# Patient Record
Sex: FEMALE | Race: WHITE | NOT HISPANIC OR LATINO | Employment: OTHER | URBAN - METROPOLITAN AREA
[De-identification: names, ages, dates, MRNs, and addresses within clinical notes are randomized per-mention and may not be internally consistent; named-entity substitution may affect disease eponyms.]

---

## 2018-02-25 ENCOUNTER — OFFICE VISIT (OUTPATIENT)
Dept: URGENT CARE | Facility: PHYSICIAN GROUP | Age: 78
End: 2018-02-25
Payer: MEDICARE

## 2018-02-25 VITALS
TEMPERATURE: 98.8 F | BODY MASS INDEX: 23.96 KG/M2 | RESPIRATION RATE: 14 BRPM | DIASTOLIC BLOOD PRESSURE: 76 MMHG | HEART RATE: 88 BPM | WEIGHT: 143.8 LBS | SYSTOLIC BLOOD PRESSURE: 134 MMHG | OXYGEN SATURATION: 96 % | HEIGHT: 65 IN

## 2018-02-25 DIAGNOSIS — J22 LOWER RESPIRATORY INFECTION (E.G., BRONCHITIS, PNEUMONIA, PNEUMONITIS, PULMONITIS): ICD-10-CM

## 2018-02-25 PROCEDURE — 99214 OFFICE O/P EST MOD 30 MIN: CPT | Performed by: PHYSICIAN ASSISTANT

## 2018-02-25 RX ORDER — PROMETHAZINE HYDROCHLORIDE AND CODEINE PHOSPHATE 6.25; 1 MG/5ML; MG/5ML
5 SYRUP ORAL
Qty: 120 ML | Refills: 0 | Status: SHIPPED | OUTPATIENT
Start: 2018-02-25 | End: 2018-03-09

## 2018-02-25 RX ORDER — AZITHROMYCIN 250 MG/1
TABLET, FILM COATED ORAL
Qty: 6 TAB | Refills: 0 | Status: SHIPPED | OUTPATIENT
Start: 2018-02-25 | End: 2020-03-10

## 2018-02-25 ASSESSMENT — ENCOUNTER SYMPTOMS
COUGH: 1
GASTROINTESTINAL NEGATIVE: 1
HEADACHES: 1
SPUTUM PRODUCTION: 1
EYES NEGATIVE: 1
PSYCHIATRIC NEGATIVE: 1
CARDIOVASCULAR NEGATIVE: 1
MUSCULOSKELETAL NEGATIVE: 1

## 2018-02-25 NOTE — PROGRESS NOTES
Subjective:      Fifi Clark is a 77 y.o. female who presents with Cough (x 1 week /)            HPI  Chief Complaint   Patient presents with   • Cough     x 1 week /       HPI:  Fifi Clark is a 77 y.o. female who presents with cough and chest congestion and vomiting.  Trying robitussen but not helping.  No fever.  Runny nose.  Cough is productive with green and thick mucus.  Some blood.  Wet sounding cough.  Did get flu vaccine..  Patient denies  SOB, chest pain, palpitations, fever, chills.      Past Medical History:   Diagnosis Date   • Hyperlipemia    • Hypertension        History reviewed. No pertinent surgical history.    History reviewed. No pertinent family history.  No pertinent family history.    Social History     Social History   • Marital status:      Spouse name: N/A   • Number of children: N/A   • Years of education: N/A     Occupational History   • Not on file.     Social History Main Topics   • Smoking status: Never Smoker   • Smokeless tobacco: Never Used   • Alcohol use Yes   • Drug use: No   • Sexual activity: Not on file     Other Topics Concern   • Not on file     Social History Narrative   • No narrative on file         Current Outpatient Prescriptions:   •  lisinopril, 10 mg, Oral, DAILY, 2/25/2018  •  simvastatin, 40 mg, Oral, Nightly, 2/25/2018  •  HYDROcodone-acetaminophen, 1 Tab, Oral, Q6HRS PRN (Patient not taking: Reported on 2/25/2018), Not Taking at Unknown time  •  aspirin, 81 mg, Oral, DAILY, not taking at Unknown    No Known Allergies      Review of Systems   Constitutional: Positive for malaise/fatigue.   HENT: Positive for congestion.    Eyes: Negative.    Respiratory: Positive for cough and sputum production.    Cardiovascular: Negative.    Gastrointestinal: Negative.    Genitourinary: Negative.    Musculoskeletal: Negative.    Skin: Negative.    Neurological: Positive for headaches.   Endo/Heme/Allergies: Negative.    Psychiatric/Behavioral: Negative.            "Objective:     /76   Pulse 88   Temp 37.1 °C (98.8 °F)   Resp 14   Ht 1.638 m (5' 4.5\")   Wt 65.2 kg (143 lb 12.8 oz)   SpO2 96%   BMI 24.30 kg/m²      Physical Exam       Nursing note and vitals reviewed.    Constitutional:   Appropriately groomed, pleasant affect, well nourished, in NAD.    Head:   Normocephalic, atraumatic.    Eyes:   PERRLA, EOM's full, sclera white, conjunctiva not erythematous, and medial canthus without exudate bilaterally.    Ears:  Auricle and tragus non-tender to manipulation.  No pre-auricular lymphadenopathy or mastoid ttp.  EACs with mild cerumen bilaterally, not erythematous.  TM’s pearly gray with cone of light present and umbo and malleolus visible bilaterally.  No bulging or fluid bubbles present in middle ear.  Hearing grossly intact to voice.    Nose:  Nares not patent bilaterally.  Nasal mucosa edematous with white rhinorrhea bilaterally.  Mild sinus tenderness to percussion.    Throat:  Dentition wnl, mucosa moist without lesions.  Oropharynx mildly erythematous, with no enlargement of the palatine tonsils bilaterally with no exudates.    Post nasal drainage  present.  Soft palate rises symmetrically bilaterally and uvula midline.      Neck: Neck supple, with mild anterior lymphadenopathy that is soft and mobile to palpation. Thyroid non-palpable without tenderness or nodules. No supraclavicular lymphadenopathy.    Lungs:  Respiratory effort not labored without accessory muscle use.  Lungs with inspiratory wheezes to auscultation. Questionable bibasilar rales. Rhonchi cleared with cough.    Heart:  RRR, without murmurs rubs or gallops.  Radial and dorsalis pedis pulse 2+ bilaterally.  No LE edema.    Musculoskeletal:  Gait non-antalgic with a narrow base.    Derm:  Skin without rashes or lesions with good turgor pressure.      Psychiatric:  Mood, affect, and judgement appropriate.       Assessment/Plan:     1. Lower respiratory infection (e.g., bronchitis, " pneumonia, pneumonitis, pulmonitis)  azithromycin (ZITHROMAX) 250 MG Tab    promethazine-codeine (PHENERGAN-CODEINE) 6.25-10 MG/5ML Syrup      Patient presents with one week of worsening lower respiratory infection now with thicker yellow-green mucus production. On exam patient is afebrile with a pulse ox of 96%. Gradual bibasilar rales. Plan to treat with azithromycin and recommend pushing fluids and nasal saline irrigation. Also prescribed cough medicine for bedtime use only.    Narcotic use report obtained with no indication of narcotic overuse or misuse and deemed necessary for treatment. Sedation, dependence, and constipation precautions given. Avoid use while driving or operating heavy machinery.      Patient was in agreement with this treatment plan and seemed to understand without barriers. All questions were encouraged and answered.  Reviewed signs and symptoms of when to seek emergency medical care.     Please note that this dictation was created using voice recognition software.  I have made every reasonable attempt to correct obvious errors, but I expect there are errors of moni and possibly content that I did not discover before finalizing the note.

## 2018-12-12 ENCOUNTER — APPOINTMENT (RX ONLY)
Dept: URBAN - METROPOLITAN AREA CLINIC 35 | Facility: CLINIC | Age: 78
Setting detail: DERMATOLOGY
End: 2018-12-12

## 2018-12-12 DIAGNOSIS — L82.1 OTHER SEBORRHEIC KERATOSIS: ICD-10-CM

## 2018-12-12 DIAGNOSIS — Z85.828 PERSONAL HISTORY OF OTHER MALIGNANT NEOPLASM OF SKIN: ICD-10-CM

## 2018-12-12 DIAGNOSIS — D22 MELANOCYTIC NEVI: ICD-10-CM

## 2018-12-12 DIAGNOSIS — L81.4 OTHER MELANIN HYPERPIGMENTATION: ICD-10-CM

## 2018-12-12 DIAGNOSIS — L11.1 TRANSIENT ACANTHOLYTIC DERMATOSIS [GROVER]: ICD-10-CM

## 2018-12-12 DIAGNOSIS — Z71.89 OTHER SPECIFIED COUNSELING: ICD-10-CM

## 2018-12-12 PROBLEM — D22.72 MELANOCYTIC NEVI OF LEFT LOWER LIMB, INCLUDING HIP: Status: ACTIVE | Noted: 2018-12-12

## 2018-12-12 PROBLEM — D22.5 MELANOCYTIC NEVI OF TRUNK: Status: ACTIVE | Noted: 2018-12-12

## 2018-12-12 PROBLEM — I10 ESSENTIAL (PRIMARY) HYPERTENSION: Status: ACTIVE | Noted: 2018-12-12

## 2018-12-12 PROCEDURE — 99213 OFFICE O/P EST LOW 20 MIN: CPT

## 2018-12-12 PROCEDURE — ? OBSERVATION AND MEASURE

## 2018-12-12 PROCEDURE — ? COUNSELING

## 2018-12-12 ASSESSMENT — LOCATION SIMPLE DESCRIPTION DERM
LOCATION SIMPLE: RIGHT SHOULDER
LOCATION SIMPLE: LEFT PRETIBIAL REGION
LOCATION SIMPLE: RIGHT PRETIBIAL REGION
LOCATION SIMPLE: ABDOMEN
LOCATION SIMPLE: NOSE
LOCATION SIMPLE: RIGHT ANKLE
LOCATION SIMPLE: LEFT EYEBROW
LOCATION SIMPLE: SUPERIOR FOREHEAD
LOCATION SIMPLE: LEFT UPPER BACK
LOCATION SIMPLE: LEFT POSTERIOR UPPER ARM
LOCATION SIMPLE: RIGHT UPPER BACK
LOCATION SIMPLE: UPPER BACK
LOCATION SIMPLE: LEFT CHEEK
LOCATION SIMPLE: LEFT PLANTAR SURFACE

## 2018-12-12 ASSESSMENT — LOCATION ZONE DERM
LOCATION ZONE: ARM
LOCATION ZONE: NOSE
LOCATION ZONE: TRUNK
LOCATION ZONE: FACE
LOCATION ZONE: LEG
LOCATION ZONE: FEET

## 2018-12-12 ASSESSMENT — LOCATION DETAILED DESCRIPTION DERM
LOCATION DETAILED: RIGHT DISTAL PRETIBIAL REGION
LOCATION DETAILED: LEFT SUPERIOR PREAURICULAR CHEEK
LOCATION DETAILED: LEFT CENTRAL MALAR CHEEK
LOCATION DETAILED: SUPERIOR THORACIC SPINE
LOCATION DETAILED: LEFT SUPERIOR UPPER BACK
LOCATION DETAILED: LEFT LATERAL EYEBROW
LOCATION DETAILED: RIGHT MID-UPPER BACK
LOCATION DETAILED: LEFT MEDIAL PLANTAR HEEL
LOCATION DETAILED: LEFT DISTAL PRETIBIAL REGION
LOCATION DETAILED: RIGHT RIB CAGE
LOCATION DETAILED: LEFT RIB CAGE
LOCATION DETAILED: RIGHT MEDIAL POSTERIOR ANKLE
LOCATION DETAILED: NASAL DORSUM
LOCATION DETAILED: PERIUMBILICAL SKIN
LOCATION DETAILED: SUPERIOR MID FOREHEAD
LOCATION DETAILED: RIGHT MEDIAL UPPER BACK
LOCATION DETAILED: LEFT MID-UPPER BACK
LOCATION DETAILED: LEFT DISTAL POSTERIOR UPPER ARM
LOCATION DETAILED: RIGHT POSTERIOR SHOULDER

## 2018-12-12 NOTE — PROCEDURE: OBSERVATION
Size Of Lesion: 1cm x 1.5cm
Detail Level: Simple
Morphology Per Location (Optional): Linear brown macule

## 2018-12-12 NOTE — PROCEDURE: COUNSELING
Detail Level: Zone
Detail Level: Detailed
Detail Level: Simple
Patient Specific Counseling (Will Not Stick From Patient To Patient): Advised she can use OTC HC 1% bid 2 weeks on 2 weeks off prn

## 2019-12-18 ENCOUNTER — APPOINTMENT (RX ONLY)
Dept: URBAN - METROPOLITAN AREA CLINIC 35 | Facility: CLINIC | Age: 79
Setting detail: DERMATOLOGY
End: 2019-12-18

## 2019-12-18 DIAGNOSIS — Z71.89 OTHER SPECIFIED COUNSELING: ICD-10-CM

## 2019-12-18 DIAGNOSIS — L82.1 OTHER SEBORRHEIC KERATOSIS: ICD-10-CM

## 2019-12-18 DIAGNOSIS — L81.4 OTHER MELANIN HYPERPIGMENTATION: ICD-10-CM

## 2019-12-18 DIAGNOSIS — D22 MELANOCYTIC NEVI: ICD-10-CM

## 2019-12-18 DIAGNOSIS — D18.0 HEMANGIOMA: ICD-10-CM

## 2019-12-18 DIAGNOSIS — Z85.828 PERSONAL HISTORY OF OTHER MALIGNANT NEOPLASM OF SKIN: ICD-10-CM

## 2019-12-18 PROBLEM — D18.01 HEMANGIOMA OF SKIN AND SUBCUTANEOUS TISSUE: Status: ACTIVE | Noted: 2019-12-18

## 2019-12-18 PROBLEM — D22.5 MELANOCYTIC NEVI OF TRUNK: Status: ACTIVE | Noted: 2019-12-18

## 2019-12-18 PROBLEM — D22.72 MELANOCYTIC NEVI OF LEFT LOWER LIMB, INCLUDING HIP: Status: ACTIVE | Noted: 2019-12-18

## 2019-12-18 PROCEDURE — ? OBSERVATION AND MEASURE

## 2019-12-18 PROCEDURE — ? COUNSELING

## 2019-12-18 PROCEDURE — 99213 OFFICE O/P EST LOW 20 MIN: CPT

## 2019-12-18 ASSESSMENT — LOCATION DETAILED DESCRIPTION DERM
LOCATION DETAILED: LEFT DISTAL PRETIBIAL REGION
LOCATION DETAILED: LEFT MEDIAL PLANTAR HEEL
LOCATION DETAILED: EPIGASTRIC SKIN
LOCATION DETAILED: LEFT DISTAL CALF
LOCATION DETAILED: LEFT MEDIAL UPPER BACK
LOCATION DETAILED: NASAL DORSUM
LOCATION DETAILED: RIGHT MEDIAL DISTAL PRETIBIAL REGION
LOCATION DETAILED: RIGHT DISTAL CALF
LOCATION DETAILED: RIGHT SUPERIOR UPPER BACK
LOCATION DETAILED: INFERIOR THORACIC SPINE
LOCATION DETAILED: LEFT RIB CAGE
LOCATION DETAILED: LEFT DISTAL RADIAL DORSAL FOREARM
LOCATION DETAILED: LEFT POSTERIOR SHOULDER
LOCATION DETAILED: RIGHT UPPER CUTANEOUS LIP
LOCATION DETAILED: RIGHT PROXIMAL DORSAL FOREARM

## 2019-12-18 ASSESSMENT — LOCATION ZONE DERM
LOCATION ZONE: NOSE
LOCATION ZONE: LIP
LOCATION ZONE: TRUNK
LOCATION ZONE: ARM
LOCATION ZONE: LEG
LOCATION ZONE: FEET

## 2019-12-18 ASSESSMENT — LOCATION SIMPLE DESCRIPTION DERM
LOCATION SIMPLE: RIGHT LIP
LOCATION SIMPLE: RIGHT PRETIBIAL REGION
LOCATION SIMPLE: LEFT UPPER BACK
LOCATION SIMPLE: RIGHT UPPER BACK
LOCATION SIMPLE: LEFT PRETIBIAL REGION
LOCATION SIMPLE: UPPER BACK
LOCATION SIMPLE: LEFT CALF
LOCATION SIMPLE: LEFT PLANTAR SURFACE
LOCATION SIMPLE: ABDOMEN
LOCATION SIMPLE: LEFT FOREARM
LOCATION SIMPLE: NOSE
LOCATION SIMPLE: RIGHT CALF
LOCATION SIMPLE: LEFT SHOULDER
LOCATION SIMPLE: RIGHT FOREARM

## 2019-12-18 NOTE — HPI: SKIN LESION
Is This A New Presentation, Or A Follow-Up?: Skin Lesion
What Type Of Note Output Would You Prefer (Optional)?: Standard Output
How Severe Is Your Skin Lesion?: mild
Has Your Skin Lesion Been Treated?: not been treated
Which Family Member (Optional)?: Son

## 2020-02-25 ENCOUNTER — HOSPITAL ENCOUNTER (OUTPATIENT)
Dept: RADIOLOGY | Facility: MEDICAL CENTER | Age: 80
End: 2020-02-25
Payer: MEDICARE

## 2020-03-10 DIAGNOSIS — Z01.812 PRE-OPERATIVE LABORATORY EXAMINATION: ICD-10-CM

## 2020-03-10 DIAGNOSIS — Z01.810 PRE-OPERATIVE CARDIOVASCULAR EXAMINATION: ICD-10-CM

## 2020-03-10 LAB
ANION GAP SERPL CALC-SCNC: 9 MMOL/L (ref 0–11.9)
BUN SERPL-MCNC: 13 MG/DL (ref 8–22)
CALCIUM SERPL-MCNC: 9.9 MG/DL (ref 8.5–10.5)
CHLORIDE SERPL-SCNC: 102 MMOL/L (ref 96–112)
CO2 SERPL-SCNC: 26 MMOL/L (ref 20–33)
CREAT SERPL-MCNC: 0.68 MG/DL (ref 0.5–1.4)
EKG IMPRESSION: NORMAL
GLUCOSE SERPL-MCNC: 104 MG/DL (ref 65–99)
POTASSIUM SERPL-SCNC: 4 MMOL/L (ref 3.6–5.5)
SODIUM SERPL-SCNC: 137 MMOL/L (ref 135–145)

## 2020-03-10 PROCEDURE — 93010 ELECTROCARDIOGRAM REPORT: CPT | Performed by: INTERNAL MEDICINE

## 2020-03-10 PROCEDURE — 36415 COLL VENOUS BLD VENIPUNCTURE: CPT

## 2020-03-10 PROCEDURE — 80048 BASIC METABOLIC PNL TOTAL CA: CPT

## 2020-03-10 PROCEDURE — 93005 ELECTROCARDIOGRAM TRACING: CPT

## 2020-03-10 RX ORDER — LISINOPRIL 20 MG/1
20 TABLET ORAL EVERY EVENING
COMMUNITY

## 2020-03-10 RX ORDER — LISINOPRIL AND HYDROCHLOROTHIAZIDE 25; 20 MG/1; MG/1
1 TABLET ORAL EVERY MORNING
COMMUNITY

## 2020-03-10 RX ORDER — CARVEDILOL 6.25 MG/1
6.25 TABLET ORAL 2 TIMES DAILY
COMMUNITY

## 2020-03-10 RX ORDER — AMLODIPINE BESYLATE 5 MG/1
5 TABLET ORAL EVERY MORNING
COMMUNITY

## 2020-03-10 RX ORDER — ASPIRIN 325 MG
325 TABLET ORAL EVERY 6 HOURS PRN
Status: ON HOLD | COMMUNITY
End: 2020-03-12

## 2020-03-11 ENCOUNTER — ANESTHESIA EVENT (OUTPATIENT)
Dept: SURGERY | Facility: MEDICAL CENTER | Age: 80
End: 2020-03-11
Payer: MEDICARE

## 2020-03-11 NOTE — OR NURSING
COVID-19 Pre-surgery screenin. Do you have an undiagnosed respiratory illness or symptoms such as coughing or sneezing? no  a. Onset of Sx   b. Acute vs. chronic respiratory illness     2. Do you have an unexplained fever greater than 100.4 degrees Fahrenheit or 38 degrees Celsius?     no    3. Have you had direct exposure to a patient who tested positive for Covid-19?    no    4. Have you traveled within the last 14 days to [place], [place], [place], [place], or [place]?    no

## 2020-03-12 ENCOUNTER — HOSPITAL ENCOUNTER (OUTPATIENT)
Dept: RADIOLOGY | Facility: MEDICAL CENTER | Age: 80
End: 2020-03-12
Attending: SURGERY
Payer: MEDICARE

## 2020-03-12 ENCOUNTER — HOSPITAL ENCOUNTER (OUTPATIENT)
Facility: MEDICAL CENTER | Age: 80
End: 2020-03-12
Attending: SURGERY | Admitting: SURGERY
Payer: MEDICARE

## 2020-03-12 ENCOUNTER — ANESTHESIA (OUTPATIENT)
Dept: SURGERY | Facility: MEDICAL CENTER | Age: 80
End: 2020-03-12
Payer: MEDICARE

## 2020-03-12 VITALS
SYSTOLIC BLOOD PRESSURE: 133 MMHG | BODY MASS INDEX: 23.24 KG/M2 | HEART RATE: 68 BPM | OXYGEN SATURATION: 95 % | TEMPERATURE: 97.8 F | RESPIRATION RATE: 16 BRPM | DIASTOLIC BLOOD PRESSURE: 63 MMHG | HEIGHT: 63 IN | WEIGHT: 131.17 LBS

## 2020-03-12 DIAGNOSIS — C50.512 MALIGNANT NEOPLASM OF LOWER-OUTER QUADRANT OF LEFT FEMALE BREAST, UNSPECIFIED ESTROGEN RECEPTOR STATUS (HCC): ICD-10-CM

## 2020-03-12 LAB — PATHOLOGY CONSULT NOTE: NORMAL

## 2020-03-12 PROCEDURE — A9270 NON-COVERED ITEM OR SERVICE: HCPCS | Performed by: ANESTHESIOLOGY

## 2020-03-12 PROCEDURE — 501838 HCHG SUTURE GENERAL: Performed by: SURGERY

## 2020-03-12 PROCEDURE — 700111 HCHG RX REV CODE 636 W/ 250 OVERRIDE (IP): Performed by: ANESTHESIOLOGY

## 2020-03-12 PROCEDURE — A6402 STERILE GAUZE <= 16 SQ IN: HCPCS | Performed by: SURGERY

## 2020-03-12 PROCEDURE — 88307 TISSUE EXAM BY PATHOLOGIST: CPT | Mod: 59

## 2020-03-12 PROCEDURE — 160029 HCHG SURGERY MINUTES - 1ST 30 MINS LEVEL 4: Performed by: SURGERY

## 2020-03-12 PROCEDURE — A9270 NON-COVERED ITEM OR SERVICE: HCPCS | Performed by: SURGERY

## 2020-03-12 PROCEDURE — 160002 HCHG RECOVERY MINUTES (STAT): Performed by: SURGERY

## 2020-03-12 PROCEDURE — 700102 HCHG RX REV CODE 250 W/ 637 OVERRIDE(OP): Performed by: ANESTHESIOLOGY

## 2020-03-12 PROCEDURE — 160046 HCHG PACU - 1ST 60 MINS PHASE II: Performed by: SURGERY

## 2020-03-12 PROCEDURE — 700105 HCHG RX REV CODE 258: Performed by: SURGERY

## 2020-03-12 PROCEDURE — 160035 HCHG PACU - 1ST 60 MINS PHASE I: Performed by: SURGERY

## 2020-03-12 PROCEDURE — 700101 HCHG RX REV CODE 250: Performed by: SURGERY

## 2020-03-12 PROCEDURE — 19285 PERQ DEV BREAST 1ST US IMAG: CPT | Mod: LT

## 2020-03-12 PROCEDURE — 160009 HCHG ANES TIME/MIN: Performed by: SURGERY

## 2020-03-12 PROCEDURE — 700111 HCHG RX REV CODE 636 W/ 250 OVERRIDE (IP): Performed by: SURGERY

## 2020-03-12 PROCEDURE — 38792 RA TRACER ID OF SENTINL NODE: CPT | Mod: LT

## 2020-03-12 PROCEDURE — 160025 RECOVERY II MINUTES (STATS): Performed by: SURGERY

## 2020-03-12 PROCEDURE — 76098 X-RAY EXAM SURGICAL SPECIMEN: CPT | Mod: LT

## 2020-03-12 PROCEDURE — 160048 HCHG OR STATISTICAL LEVEL 1-5: Performed by: SURGERY

## 2020-03-12 PROCEDURE — 160041 HCHG SURGERY MINUTES - EA ADDL 1 MIN LEVEL 4: Performed by: SURGERY

## 2020-03-12 PROCEDURE — 700102 HCHG RX REV CODE 250 W/ 637 OVERRIDE(OP): Performed by: SURGERY

## 2020-03-12 PROCEDURE — 700101 HCHG RX REV CODE 250: Performed by: ANESTHESIOLOGY

## 2020-03-12 RX ORDER — ACETAMINOPHEN 325 MG/1
650 TABLET ORAL EVERY 4 HOURS PRN
Status: DISCONTINUED | OUTPATIENT
Start: 2020-03-12 | End: 2020-03-12 | Stop reason: HOSPADM

## 2020-03-12 RX ORDER — CEFAZOLIN SODIUM 1 G/3ML
INJECTION, POWDER, FOR SOLUTION INTRAMUSCULAR; INTRAVENOUS PRN
Status: DISCONTINUED | OUTPATIENT
Start: 2020-03-12 | End: 2020-03-12 | Stop reason: SURG

## 2020-03-12 RX ORDER — HYDRALAZINE HYDROCHLORIDE 20 MG/ML
5 INJECTION INTRAMUSCULAR; INTRAVENOUS
Status: DISCONTINUED | OUTPATIENT
Start: 2020-03-12 | End: 2020-03-12 | Stop reason: HOSPADM

## 2020-03-12 RX ORDER — OXYCODONE HCL 5 MG/5 ML
10 SOLUTION, ORAL ORAL
Status: COMPLETED | OUTPATIENT
Start: 2020-03-12 | End: 2020-03-12

## 2020-03-12 RX ORDER — DEXAMETHASONE SODIUM PHOSPHATE 4 MG/ML
INJECTION, SOLUTION INTRA-ARTICULAR; INTRALESIONAL; INTRAMUSCULAR; INTRAVENOUS; SOFT TISSUE PRN
Status: DISCONTINUED | OUTPATIENT
Start: 2020-03-12 | End: 2020-03-12 | Stop reason: SURG

## 2020-03-12 RX ORDER — KETOROLAC TROMETHAMINE 30 MG/ML
INJECTION, SOLUTION INTRAMUSCULAR; INTRAVENOUS PRN
Status: DISCONTINUED | OUTPATIENT
Start: 2020-03-12 | End: 2020-03-12 | Stop reason: SURG

## 2020-03-12 RX ORDER — ALPRAZOLAM 0.25 MG/1
0.25 TABLET ORAL
Status: COMPLETED | OUTPATIENT
Start: 2020-03-12 | End: 2020-03-12

## 2020-03-12 RX ORDER — HYDROCODONE BITARTRATE AND ACETAMINOPHEN 5; 325 MG/1; MG/1
1-2 TABLET ORAL EVERY 6 HOURS PRN
Status: DISCONTINUED | OUTPATIENT
Start: 2020-03-12 | End: 2020-03-12 | Stop reason: HOSPADM

## 2020-03-12 RX ORDER — LIDOCAINE AND PRILOCAINE 25; 25 MG/G; MG/G
CREAM TOPICAL
Status: COMPLETED | OUTPATIENT
Start: 2020-03-12 | End: 2020-03-12

## 2020-03-12 RX ORDER — SODIUM CHLORIDE 9 MG/ML
INJECTION, SOLUTION INTRAVENOUS CONTINUOUS
Status: DISCONTINUED | OUTPATIENT
Start: 2020-03-12 | End: 2020-03-12 | Stop reason: HOSPADM

## 2020-03-12 RX ORDER — ONDANSETRON 2 MG/ML
4 INJECTION INTRAMUSCULAR; INTRAVENOUS
Status: DISCONTINUED | OUTPATIENT
Start: 2020-03-12 | End: 2020-03-12 | Stop reason: HOSPADM

## 2020-03-12 RX ORDER — OXYCODONE HCL 5 MG/5 ML
5 SOLUTION, ORAL ORAL
Status: COMPLETED | OUTPATIENT
Start: 2020-03-12 | End: 2020-03-12

## 2020-03-12 RX ORDER — BUPIVACAINE HYDROCHLORIDE 2.5 MG/ML
INJECTION, SOLUTION EPIDURAL; INFILTRATION; INTRACAUDAL
Status: DISCONTINUED | OUTPATIENT
Start: 2020-03-12 | End: 2020-03-12 | Stop reason: HOSPADM

## 2020-03-12 RX ORDER — ONDANSETRON 2 MG/ML
INJECTION INTRAMUSCULAR; INTRAVENOUS PRN
Status: DISCONTINUED | OUTPATIENT
Start: 2020-03-12 | End: 2020-03-12 | Stop reason: SURG

## 2020-03-12 RX ORDER — SODIUM CHLORIDE, SODIUM LACTATE, POTASSIUM CHLORIDE, CALCIUM CHLORIDE 600; 310; 30; 20 MG/100ML; MG/100ML; MG/100ML; MG/100ML
INJECTION, SOLUTION INTRAVENOUS CONTINUOUS
Status: DISCONTINUED | OUTPATIENT
Start: 2020-03-12 | End: 2020-03-12 | Stop reason: HOSPADM

## 2020-03-12 RX ADMIN — LIDOCAINE AND PRILOCAINE 1 APPLICATION: 25; 25 CREAM TOPICAL at 08:59

## 2020-03-12 RX ADMIN — LIDOCAINE HYDROCHLORIDE 0.5 ML: 10 INJECTION, SOLUTION EPIDURAL; INFILTRATION; INTRACAUDAL at 09:09

## 2020-03-12 RX ADMIN — SODIUM CHLORIDE, POTASSIUM CHLORIDE, SODIUM LACTATE AND CALCIUM CHLORIDE: 600; 310; 30; 20 INJECTION, SOLUTION INTRAVENOUS at 09:09

## 2020-03-12 RX ADMIN — OXYCODONE HYDROCHLORIDE 10 MG: 5 SOLUTION ORAL at 13:30

## 2020-03-12 RX ADMIN — ALPRAZOLAM 0.25 MG: 0.25 TABLET ORAL at 08:59

## 2020-03-12 RX ADMIN — PROPOFOL 150 MG: 10 INJECTION, EMULSION INTRAVENOUS at 12:01

## 2020-03-12 RX ADMIN — KETOROLAC TROMETHAMINE 30 MG: 30 INJECTION, SOLUTION INTRAMUSCULAR at 12:34

## 2020-03-12 RX ADMIN — FENTANYL CITRATE 50 MCG: 50 INJECTION, SOLUTION INTRAMUSCULAR; INTRAVENOUS at 12:22

## 2020-03-12 RX ADMIN — FENTANYL CITRATE 50 MCG: 50 INJECTION, SOLUTION INTRAMUSCULAR; INTRAVENOUS at 12:09

## 2020-03-12 RX ADMIN — FENTANYL CITRATE 50 MCG: 50 INJECTION, SOLUTION INTRAMUSCULAR; INTRAVENOUS at 12:34

## 2020-03-12 RX ADMIN — CEFAZOLIN 2 G: 330 INJECTION, POWDER, FOR SOLUTION INTRAMUSCULAR; INTRAVENOUS at 12:05

## 2020-03-12 RX ADMIN — DEXAMETHASONE SODIUM PHOSPHATE 4 MG: 4 INJECTION, SOLUTION INTRA-ARTICULAR; INTRALESIONAL; INTRAMUSCULAR; INTRAVENOUS; SOFT TISSUE at 12:10

## 2020-03-12 RX ADMIN — ONDANSETRON 4 MG: 2 INJECTION INTRAMUSCULAR; INTRAVENOUS at 12:34

## 2020-03-12 RX ADMIN — FENTANYL CITRATE 50 MCG: 50 INJECTION, SOLUTION INTRAMUSCULAR; INTRAVENOUS at 12:01

## 2020-03-12 RX ADMIN — EPHEDRINE SULFATE 5 MG: 50 INJECTION, SOLUTION INTRAVENOUS at 12:32

## 2020-03-12 SDOH — HEALTH STABILITY: MENTAL HEALTH: HOW MANY STANDARD DRINKS CONTAINING ALCOHOL DO YOU HAVE ON A TYPICAL DAY?: 3 OR 4

## 2020-03-12 SDOH — HEALTH STABILITY: MENTAL HEALTH: HOW OFTEN DO YOU HAVE A DRINK CONTAINING ALCOHOL?: 4 OR MORE TIMES A WEEK

## 2020-03-12 NOTE — PROGRESS NOTES
Pre-op complete. Emla cream in place to left breast for 0930 wire localization.  Patient and family updated on plan of care. All questions answered at this time.  Call light within reach, advised to call for any questions or concerns. Hourly rounding in place.

## 2020-03-12 NOTE — DISCHARGE INSTRUCTIONS
ACTIVITY: Rest and take it easy for the first 24 hours.  A responsible adult is recommended to remain with you during that time.  It is normal to feel sleepy.  We encourage you to not do anything that requires balance, judgment or coordination.    MILD FLU-LIKE SYMPTOMS ARE NORMAL. YOU MAY EXPERIENCE GENERALIZED MUSCLE ACHES, THROAT IRRITATION, HEADACHE AND/OR SOME NAUSEA.    FOR 24 HOURS DO NOT:  Drive, operate machinery or run household appliances.  Drink beer or alcoholic beverages.   Make important decisions or sign legal documents.    SPECIAL INSTRUCTIONS:     On the day of surgery we will be removing the lump of your breast cancer (lumpectomy) and through a separate incision under your arm we will be taking out the 2-3 lymph nodes that drain your breast (sentinel lymph node dissection).     Wound Care   You will have 2 incisions: 1) on your breast (lumpectomy) and 2) under your arm (sentinel lymph node biopsy).   All of your stitches are buried under the skin and dissolveable. There are no sutures to remove. Your dressing is waterproof.   You can shower the day after your surgery and get your wound wet (it will be sealed by the waterproof dressings)   You may have some bruising after surgery. This is normal.   There may be a small amount of drainage from your wounds, this is usually normal and nothing to worry about. Place a dry gauze or bandage (available at any drug store) on the wound to absorb any drainage.   You can remove the dressing 2 days after surgery.     For Pain   Wear your bra as much as possible including to bed. The less your breast moves the less it will hurt.   You may take Tylenol or Advil every 4-6 hours as directed on the bottle.   You will be given a prescription for more severe pain. You can use it as needed.     Work   If you would like, you may return to work the day after your biopsy.   If you need a work excuse for the day of surgery or for any days thereafter, please let us know.      When to call the doctor   If you have severe, uncontrolled pain at the biopsy site.   If you run at fever of 100.5?F or higher within a few days of the biopsy.   If you have a large amount of drainage that is soaking the bandage more than once a day.   If the area around your wound becomes red/inflamed.   Make sure you schedule and attend all follow-up visits with your doctor. You should have a follow up appointment in about a week after surgery.     If you have any additional questions, please do not hesitate to call the office.     DIET: To avoid nausea, slowly advance diet as tolerated, avoiding spicy or greasy foods for the first day.  Add more substantial food to your diet according to your physician's instructions.  Babies can be fed formula or breast milk as soon as they are hungry.  INCREASE FLUIDS AND FIBER TO AVOID CONSTIPATION.    SURGICAL DRESSING/BATHING: May remove dressings 3/13. See above instructions.     FOLLOW-UP APPOINTMENT:  A follow-up appointment should be arranged with your doctor on 3/13 at 4 pm; call to schedule.    You should CALL YOUR PHYSICIAN if you develop:  Fever greater than 101 degrees F.  Pain not relieved by medication, or persistent nausea or vomiting.  Excessive bleeding (blood soaking through dressing) or unexpected drainage from the wound.  Extreme redness or swelling around the incision site, drainage of pus or foul smelling drainage.  Inability to urinate or empty your bladder within 8 hours.  Problems with breathing or chest pain.    You should call 911 if you develop problems with breathing or chest pain.  If you are unable to contact your doctor or surgical center, you should go to the nearest emergency room or urgent care center.  Physician's telephone #: [phone]    If any questions arise, call your doctor.  If your doctor is not available, please feel free to call the Surgical Center at [phone].  The Center is open Monday through Friday from 7AM to 7PM.   You can also call the HEALTH HOTLINE open 24 hours/day, 7 days/week and speak to a nurse at [phone], or toll free at [phone].    A registered nurse may call you a few days after your surgery to see how you are doing after your procedure.    MEDICATIONS: Resume taking daily medication.  Take prescribed pain medication with food.  If no medication is prescribed, you may take non-aspirin pain medication if needed.  PAIN MEDICATION CAN BE VERY CONSTIPATING.  Take a stool softener or laxative such as senokot, pericolace, or milk of magnesia if needed.    Prescription given for Norco.  Last pain medication given at 1:30pm (Can have next pain medication at                           ).     If your physician has prescribed pain medication that includes Acetaminophen (Tylenol), do not take additional Acetaminophen (Tylenol) while taking the prescribed medication.    Depression / Suicide Risk    As you are discharged from this [facility] facility, it is important to learn how to keep safe from harming yourself.    Recognize the warning signs:  · Abrupt changes in personality, positive or negative- including increase in energy   · Giving away possessions  · Change in eating patterns- significant weight changes-  positive or negative  · Change in sleeping patterns- unable to sleep or sleeping all the time   · Unwillingness or inability to communicate  · Depression  · Unusual sadness, discouragement and loneliness  · Talk of wanting to die  · Neglect of personal appearance   · Rebelliousness- reckless behavior  · Withdrawal from people/activities they love  · Confusion- inability to concentrate     If you or a loved one observes any of these behaviors or has concerns about self-harm, here's what you can do:  · Talk about it- your feelings and reasons for harming yourself  · Remove any means that you might use to hurt yourself (examples: pills, rope, extension cords, firearm)  · Get professional help from the  community (Mental Health, Substance Abuse, psychological counseling)  · Do not be alone:Call your Safe Contact- someone whom you trust who will be there for you.  · Call your local CRISIS HOTLINE 727-4165 or 958-805-6697  · Call your local Children's Mobile Crisis Response Team [place] (184) 119-8254 or www.Uniweb.ru  · Call the toll free National Suicide Prevention Hotlines   · National Suicide Prevention Lifeline 828-771-NOYX (6649)  · National Hope Line Network 800-SUICIDE (177-9234)

## 2020-03-12 NOTE — ANESTHESIA PROCEDURE NOTES
Airway  Date/Time: 3/12/2020 12:02 PM  Performed by: Tish Foley M.D.  Authorized by: Tish Foley M.D.     Location:  OR  Urgency:  Elective  Indications for Airway Management:  Anesthesia  Spontaneous Ventilation: absent    Sedation Level:  Deep  Preoxygenated: Yes    Mask Difficulty Assessment:  1 - vent by mask  Final Airway Type:  Supraglottic airway  Final Supraglottic Airway:  Standard LMA  SGA Size:  4  Number of Attempts at Approach:  1

## 2020-03-12 NOTE — OR NURSING
Pt arrived to phase II. Pt states that she has minimal and is tolerable to go home. D/C instructions given to pt and family, verbalized understanding. PIV removed prior to D/C.

## 2020-03-12 NOTE — ANESTHESIA TIME REPORT
Anesthesia Start and Stop Event Times     Date Time Event    3/12/2020 1024 Ready for Procedure     1156 Anesthesia Start     1246 Anesthesia Stop        Responsible Staff  03/12/20    Name Role Begin End    Tish Foley M.D. Anesth 1156 1246        Preop Diagnosis (Free Text):  Pre-op Diagnosis     BREARS CANCER, LOWE-OUTER QUADRANT        Preop Diagnosis (Codes):    Post op Diagnosis  Breast cancer (HCC)      Premium Reason  Non-Premium    Comments:

## 2020-03-12 NOTE — OP REPORT
DATE OF SERVICE:  03/12/2020    PREOPERATIVE DIAGNOSIS:  Left-sided breast cancer.    POSTOPERATIVE DIAGNOSIS:  Left-sided breast cancer.    PROCEDURE:  1.  Left partial mastectomy after wire localization.  Local tissue   rearrangement performed.  2.  Left sentinel lymph node biopsy.    SURGEON:  Sivan Thompson MD    ASSISTANT:  STEVE Holland    ANESTHESIA:  Laryngeal mask.    ANESTHESIOLOGIST:  Tish Foley MD    INDICATIONS:  The patient is a 79-year-old female who has a new breast lesion   that was biopsied and found to have infiltrating ductal carcinoma.  She is   being brought to the operating room at this time for a left partial mastectomy   and sentinel node biopsy.    FINDINGS:  Specimen mammogram containing the abnormality.  There were 3   sentinel lymph nodes that were sent to pathology.    DESCRIPTION OF PROCEDURE:  After the patient was identified and consented, she   was brought to the operating room and placed in supine position.  Patient   underwent laryngeal mask anesthetic clearance.  Patient's left breast and   axilla were prepped and draped in sterile fashion.  Attention was first turned   to sentinel node biopsy.  Using a navigator probe, there was uptake noted in   the axilla.  A curvilinear incision was made in the inferior hairline using   electrocautery.  Subcutaneous tissue was dissected down through the axillary   fascia, which was opened.  Using navigator probe, there were 3 lymph nodes   that were identified,  from surrounding tissues using LigaSure   device, sent to pathology for evaluation.  Once that was completed, the wound   was irrigated and hemostasis secured.  It was closed with 3-0 Vicryl   subcutaneous layer and skin was closed with running 4-0 Vicryl in subcuticular   fashion.  Once that was completed, attention was then turned to the partial   mastectomy.  A curvilinear incision was made in the inferior lateral fold   using electrocautery,  subcutaneous tissue was dissected down the wire.  Tissue   around the wire was grasped and widely excised with electrocautery,  and sent   to pathology for evaluation.  It was x-rayed to verify the specimen   containing the abnormality.  The wound was then irrigated.  There was local   tissue rearrangement to fill the space.  It was closed with 3-0 Vicryl   subcutaneous layer and skin was closed with running 4-0 Vicryl in subcuticular   fashion.  Op-Site dressing placed on the wounds.  Patient was extubated and   taken to recovery in stable condition.  All sponge and needle counts were   correct.       ____________________________________     LANRE BERG MD    [facility]    DD:  03/12/2020 12:38:33  DT:  03/12/2020 13:19:52    D#:  [id number]  Job#:  [id number]    cc: MUSA CHARLES MD, Tish Foley MD

## 2020-03-12 NOTE — ANESTHESIA PREPROCEDURE EVALUATION
78 y/o female with new diagnosis of left breast cancer, here for partial mastectomy and sentinel node dissection, h/o HTN. No problems with anesthesia in the past. She does drink 3-4 glasses of wine per night. No CP SOB, CAD, COPD.    Relevant Problems   No relevant active problems       Physical Exam    Airway   Mallampati: II  TM distance: >3 FB  Neck ROM: full       Cardiovascular - normal exam  Rhythm: regular  Rate: normal     Dental - normal exam         Pulmonary   Breath sounds clear to auscultation     Abdominal    Neurological - normal exam                 Anesthesia Plan    ASA 2       Plan - general       Airway plan will be LMA        Induction: intravenous          Informed Consent:    Anesthetic plan and risks discussed with patient.

## 2020-03-12 NOTE — ANESTHESIA POSTPROCEDURE EVALUATION
Patient: Fifi Clark    Procedure Summary     Date:  03/12/20 Room / Location:  [facility] OR 08 / SURGERY [facility]    Anesthesia Start:  1156 Anesthesia Stop:  1246    Procedures:       MASTECTOMY, PARTIAL-AFTER WIRE LOCALIZATION (Left Breast)      BIOPSY, LYMPH NODE, SENTINEL-AXILLARY AFTER SENTINEL NODE INJECTION (Left Axilla) Diagnosis:  (breast cancer left lower outer quadrant)    Surgeon:  Sivan Thompson M.D. Responsible Provider:  Tish Foley M.D.    Anesthesia Type:  general ASA Status:  2          Final Anesthesia Type: general  Last vitals  BP   Blood Pressure : 113/48    Temp   36.3 °C (97.3 °F)    Pulse   Pulse: 66   Resp   15    SpO2   95 %      Anesthesia Post Evaluation    Patient location during evaluation: PACU  Patient participation: complete - patient participated  Level of consciousness: awake and alert    Airway patency: patent  Anesthetic complications: no  Cardiovascular status: hemodynamically stable  Respiratory status: acceptable  Hydration status: euvolemic    PONV: none           Nurse Pain Score: 0 (NPRS)

## 2020-03-24 ENCOUNTER — PATIENT OUTREACH (OUTPATIENT)
Dept: OTHER | Facility: MEDICAL CENTER | Age: 80
End: 2020-03-24

## 2020-03-24 NOTE — PROGRESS NOTES
Oncology Nurse Navigation   Intro call to pt.  Pt states she is healing well.  She has a follow up appt with Dr. Thompson this Friday 3/27/20.  She states she will be referred to Dr. Patricia for radiation and has an appointment with [facility] on 4/2/20.  She states her primary concern at this time is COVID-19.  Pt has questions about the process for each office in regards to whether or not she will wear a mask and whether or not visitors will be allowed to accompany her.  No other concerns at this time.  Provided pt with direct contact information.  Pt will call should anything arise.

## 2020-04-24 ENCOUNTER — PATIENT OUTREACH (OUTPATIENT)
Dept: OTHER | Facility: MEDICAL CENTER | Age: 80
End: 2020-04-24

## 2020-04-24 NOTE — PROGRESS NOTES
Oncology Nurse Navigation  Phoned pt for f/u.  She states she is scheduled to see Dr. Patricia at the end of May.  ONN addressed questions regarding radiation therapy planning and treatment.  Pt grateful for the information.  She states that she has started an AI.  She denies any barriers at this time and will call should any needs arise.

## 2020-05-18 PROBLEM — Z17.0 MALIGNANT NEOPLASM OF LOWER-OUTER QUADRANT OF LEFT BREAST OF FEMALE, ESTROGEN RECEPTOR POSITIVE (HCC): Status: ACTIVE | Noted: 2020-05-18

## 2020-05-18 PROBLEM — C50.512 MALIGNANT NEOPLASM OF LOWER-OUTER QUADRANT OF LEFT BREAST OF FEMALE, ESTROGEN RECEPTOR POSITIVE (HCC): Status: ACTIVE | Noted: 2020-05-18

## 2020-05-26 ENCOUNTER — HOSPITAL ENCOUNTER (OUTPATIENT)
Dept: RADIATION ONCOLOGY | Facility: MEDICAL CENTER | Age: 80
End: 2020-05-31
Attending: RADIOLOGY
Payer: MEDICARE

## 2020-05-26 VITALS
OXYGEN SATURATION: 97 % | HEART RATE: 69 BPM | TEMPERATURE: 97.6 F | DIASTOLIC BLOOD PRESSURE: 74 MMHG | SYSTOLIC BLOOD PRESSURE: 132 MMHG

## 2020-05-26 DIAGNOSIS — C50.512 MALIGNANT NEOPLASM OF LOWER-OUTER QUADRANT OF LEFT BREAST OF FEMALE, ESTROGEN RECEPTOR POSITIVE (HCC): ICD-10-CM

## 2020-05-26 DIAGNOSIS — Z17.0 MALIGNANT NEOPLASM OF LOWER-OUTER QUADRANT OF LEFT BREAST OF FEMALE, ESTROGEN RECEPTOR POSITIVE (HCC): ICD-10-CM

## 2020-05-26 PROCEDURE — 99205 OFFICE O/P NEW HI 60 MIN: CPT | Performed by: RADIOLOGY

## 2020-05-26 PROCEDURE — 99214 OFFICE O/P EST MOD 30 MIN: CPT | Performed by: RADIOLOGY

## 2020-05-26 RX ORDER — ANASTROZOLE 1 MG/1
TABLET ORAL
COMMUNITY
Start: 2020-04-15

## 2020-05-26 ASSESSMENT — PAIN SCALES - GENERAL: PAINLEVEL: NO PAIN

## 2020-05-26 NOTE — CONSULTS
RADIATION ONCOLOGY CONSULT    DATE OF SERVICE: 5/26/2020    IDENTIFICATION: A 79 y.o. female with a pT1cNo. ER 80-90 ME 20 grade 2 infiltrating ductal carcinoma HER-2/daren 1+ Ki-67 10 to 15% status post lumpectomy and sentinel node dissection with negative lymph nodes and clear margins. The superior margin was 1 mm over a 2 mm breath all others greater than 2 mm.  Here at the request of Dr. Zohreh Thompson     HISTORY OF PRESENT ILLNESS: Patient's history dates back to 12/18/2019 when on mammogram she was found to have a focal asymmetry in the left breast.  Ultrasound 1/6/2020 showed a hypoechoic mass in the left breast at the 5 o'clock position in the lower outer quadrant.  She underwent a biopsy 2/5/2020 showing a grade 2 infiltrating ductal carcinoma ER 80 to 90% ME 20% Ki-67 10 to 15% HER-2/daren IHC 1+.  She underwent a lumpectomy and sentinel node dissection on 3/12/2020.  [place] nodes were negative.  The tumor was 1.9 cm and there was a 1 mm superior margin over 2 mm breath all other margins were greater than 2 mm.  She has seen Dr. Ayala and has been placed on an aromatase inhibitor and she is here to explore her option of radiation therapy to decrease her chance of local regional recurrence.    PAST MEDICAL HISTORY:   Past Medical History:   Diagnosis Date   • Arthritis     knees/hands   • Cancer (HCC) 2020    breast left   • Cancer (HCC) 2015    skin  X4   • GERD (gastroesophageal reflux disease)    • High cholesterol    • Hyperlipemia    • Hypertension    • Indigestion        PAST SURGICAL HISTORY:  Past Surgical History:   Procedure Laterality Date   • PARTIAL MASTECTOMY Left 3/12/2020    Procedure: MASTECTOMY, PARTIAL-AFTER WIRE LOCALIZATION;  Surgeon: Sivan Thompson M.D.;  Location: [facility];  Service: General   • NODE BIOPSY SENTINEL Left 3/12/2020    Procedure: BIOPSY, LYMPH NODE, SENTINEL-AXILLARY AFTER SENTINEL NODE INJECTION;  Surgeon: Sivan Thompson M.D.;  Location: [facility]  [facility];  Service: General   • GYN SURGERY      hysterectomy   • OTHER      MOS for skin cancer X4       GYNECOLOGICAL STATUS:   miscarriage 1 on hormone replacement for about 5 years and birth control pills for about 5 years.  Had menses age 14 first pregnancy age 20.    CURRENT MEDICATIONS:  Current Outpatient Medications   Medication Sig Dispense Refill   • anastrozole (ARIMIDEX) 1 MG Tab      • Fexofenadine-Pseudoephedrine (ALLEGRA-D 24 HOUR PO) Take 1 Tab by mouth 1 time daily as needed.     • lisinopril-hydrochlorothiazide (PRINZIDE) 20-25 MG per tablet Take 1 Tab by mouth every morning.     • carvedilol (COREG) 6.25 MG Tab Take 6.25 mg by mouth 2 Times a Day.     • amLODIPine (NORVASC) 5 MG Tab Take 5 mg by mouth every morning.     • Multiple Vitamin (MULTI-VITAMIN DAILY PO) Take 1 Tab by mouth every day.     • lisinopril (PRINIVIL) 20 MG Tab Take 20 mg by mouth every evening.     • Glucosamine-Chondroit-Vit C-Mn (GLUCOSAMINE 1500 COMPLEX PO) Take 1 Tab by mouth every morning.     • CALCIUM PO Take 2,400 mg by mouth every day.     • Omega-3 Fatty Acids (FISH OIL PO) Take 1 Tab by mouth every 48 hours.     • simvastatin (ZOCOR) 40 MG TABS Take 40 mg by mouth every evening.       No current facility-administered medications for this encounter.        ALLERGIES:    Other environmental and Sulfa drugs    FAMILY HISTORY:    Family History   Problem Relation Age of Onset   • Cancer Father    • Cancer Other    [unfilled]        SOCIAL HISTORY:     reports that she has never smoked. She has never used smokeless tobacco. She reports current alcohol use. She reports that she does not use drugs.  Has 15-21 drinks per week    REVIEW OF SYSTEMS: Pertinent positives consist of  Fatigue, vertigo, constipation diarrhea which is chronic joint pain because of arthritis some dizziness which she attributes to the anastrozole.  She also has occasional unsteady gait over the last few years.  The rest of the review of  systems is negative and has been reviewed by me. It is in the nursing note dated 5/26/2020 in [facility]    Patient has no pain      PHYSICAL EXAM:    0= Fully active, able to carry on all pre-disease performance without restriction.  Vitals:    05/26/20 1246   BP: 132/74   Pulse: 69   Temp: 36.4 °C (97.6 °F)   SpO2: 97%   Pain Score: No pain        GENERAL: Well-appearing alert and oriented x3 in no apparent distress  Breasts: Bilaterally symmetrical the left breast is little bit more retracted compared to the right there is evidence of the lumpectomy scar and the axillary scar both are well-healed.  No discrete masses are appreciated in either breast both breasts are pendulous.  HEENT:  Pupils are equal, round, and reactive to light.  Extraocular muscles   are intact. Sclerae nonicteric.  Conjunctivae pink.  Oral cavity, tongue   protrudes midline.   NECK:   No peripheral adenopathy of the neck, supraclavicular fossa or axillae   bilaterally.  LUNGS:  Clear to ascultation   HEART:  Regular rate and rhythm.  No murmur appreciated  ABDOMEN:  Soft. No evidence of hepatosplenomegaly.    EXTREMITIES:  Without Edema.  NEUROLOGIC:  Cranial nerves II through XII were intact. Normal stance and gait motor and sensory grossly within normal limits                IMPRESSION:    A 79 y.o. with   a pT1cNo. ER 80-90 ND 20 grade 2 infiltrating ductal carcinoma HER-2/daren 1+ Ki-67 10 to 15% status post lumpectomy and sentinel node dissection with negative lymph nodes and clear margins. The superior margin was 1 mm over a 2 mm breath all others greater than 2 mm. .      RECOMMENDATIONS:   I had a long discussion with the patient and her  regarding diagnosis prognosis and treatment.She is concerned about radiation therapy.  She understands that it would decrease her chance of local regional recurrence.  We discussed the studies of women over the age of 70 foregoing radiation therapy.  I did discuss that she is extremely healthy and  probably will live another 10 years so that her local recurrence rate would probably increase especially because she does have a close margin.  Most the studies had at least a 5 mm margin.  She understands that she and her  are going to the discussed this and they will give me a call if they have decided to pursue treatments.  I've described the details of radiation along with the side effects both acute and chronic, including but not exclusive to fatigue, skin reaction, local soreness, swelling, delayed healing, scarring fibrosis discoloration. Ample time was allowed for questions, and patient understands.      Thank you for the opportunity to participate in her care.  If any questions or comments, please do not hesitate in calling.    Please note that this dictation was created using voice recognition software. I have made every reasonable attempt to correct obvious errors, but I expect that there are errors of grammar and possibly content that I did not discover before finalizing the note.

## 2020-05-26 NOTE — NON-PROVIDER
Patient was seen today in clinic with Dr. Patricia for Consult.  Vitals signs and weight were obtained and pain assessment was completed.  Allergies and medications were reviewed with the patient.  Review of systems completed.     Vitals/Pain:  Vitals:    05/26/20 1246   BP: 132/74   Pulse: 69   Temp: 36.4 °C (97.6 °F)   SpO2: 97%   Pain Score: No pain        Allergies:   Other environmental and Sulfa drugs    Current Medications:  Current Outpatient Medications   Medication Sig Dispense Refill   • anastrozole (ARIMIDEX) 1 MG Tab      • Fexofenadine-Pseudoephedrine (ALLEGRA-D 24 HOUR PO) Take 1 Tab by mouth 1 time daily as needed.     • lisinopril-hydrochlorothiazide (PRINZIDE) 20-25 MG per tablet Take 1 Tab by mouth every morning.     • carvedilol (COREG) 6.25 MG Tab Take 6.25 mg by mouth 2 Times a Day.     • amLODIPine (NORVASC) 5 MG Tab Take 5 mg by mouth every morning.     • Multiple Vitamin (MULTI-VITAMIN DAILY PO) Take 1 Tab by mouth every day.     • lisinopril (PRINIVIL) 20 MG Tab Take 20 mg by mouth every evening.     • Glucosamine-Chondroit-Vit C-Mn (GLUCOSAMINE 1500 COMPLEX PO) Take 1 Tab by mouth every morning.     • CALCIUM PO Take 2,400 mg by mouth every day.     • Omega-3 Fatty Acids (FISH OIL PO) Take 1 Tab by mouth every 48 hours.     • simvastatin (ZOCOR) 40 MG TABS Take 40 mg by mouth every evening.       No current facility-administered medications for this encounter.          PCP:  [facility]ac Del[facility]

## 2020-07-13 ENCOUNTER — PATIENT OUTREACH (OUTPATIENT)
Dept: OTHER | Facility: MEDICAL CENTER | Age: 80
End: 2020-07-13

## 2020-07-13 NOTE — PROGRESS NOTES
Oncology Nurse Navigation  Phoned pt for f/u.  She states she decided not to move forward with radiation.  She states she is due to follow up with Dr. Ayala in July.  She is taking anastrozole and is tolerating it well.  She states Dr. Thompson recommended a mammogram in July but she plans to discuss her surveillance plan with Dr. Ayala before she schedules it.  Pt denies any questions or concerns at this time.  Cancer treatment summary / SCP completed and mailed to pt.

## 2020-12-22 ENCOUNTER — APPOINTMENT (RX ONLY)
Dept: URBAN - METROPOLITAN AREA CLINIC 35 | Facility: CLINIC | Age: 80
Setting detail: DERMATOLOGY
End: 2020-12-22

## 2020-12-22 DIAGNOSIS — L82.1 OTHER SEBORRHEIC KERATOSIS: ICD-10-CM

## 2020-12-22 DIAGNOSIS — D22 MELANOCYTIC NEVI: ICD-10-CM

## 2020-12-22 DIAGNOSIS — Z85.828 PERSONAL HISTORY OF OTHER MALIGNANT NEOPLASM OF SKIN: ICD-10-CM

## 2020-12-22 DIAGNOSIS — Z71.89 OTHER SPECIFIED COUNSELING: ICD-10-CM

## 2020-12-22 DIAGNOSIS — L11.1 TRANSIENT ACANTHOLYTIC DERMATOSIS [GROVER]: ICD-10-CM

## 2020-12-22 DIAGNOSIS — L81.4 OTHER MELANIN HYPERPIGMENTATION: ICD-10-CM

## 2020-12-22 PROBLEM — D22.5 MELANOCYTIC NEVI OF TRUNK: Status: ACTIVE | Noted: 2020-12-22

## 2020-12-22 PROBLEM — D22.72 MELANOCYTIC NEVI OF LEFT LOWER LIMB, INCLUDING HIP: Status: ACTIVE | Noted: 2020-12-22

## 2020-12-22 PROCEDURE — 99213 OFFICE O/P EST LOW 20 MIN: CPT

## 2020-12-22 PROCEDURE — ? COUNSELING

## 2020-12-22 PROCEDURE — ? PRESCRIPTION

## 2020-12-22 PROCEDURE — ? OBSERVATION AND MEASURE

## 2020-12-22 RX ORDER — TRIAMCINOLONE ACETONIDE 1 MG/G
CREAM TOPICAL BID
Qty: 1 | Refills: 1 | Status: ERX | COMMUNITY
Start: 2020-12-22

## 2020-12-22 RX ADMIN — TRIAMCINOLONE ACETONIDE THIN LAYER: 1 CREAM TOPICAL at 00:00

## 2020-12-22 ASSESSMENT — LOCATION SIMPLE DESCRIPTION DERM
LOCATION SIMPLE: ABDOMEN
LOCATION SIMPLE: UPPER BACK
LOCATION SIMPLE: LEFT PLANTAR SURFACE
LOCATION SIMPLE: NOSE
LOCATION SIMPLE: RIGHT UPPER BACK
LOCATION SIMPLE: LEFT UPPER BACK

## 2020-12-22 ASSESSMENT — LOCATION DETAILED DESCRIPTION DERM
LOCATION DETAILED: SUPERIOR THORACIC SPINE
LOCATION DETAILED: NASAL DORSUM
LOCATION DETAILED: INFERIOR THORACIC SPINE
LOCATION DETAILED: EPIGASTRIC SKIN
LOCATION DETAILED: LEFT MEDIAL PLANTAR HEEL
LOCATION DETAILED: LEFT MEDIAL UPPER BACK
LOCATION DETAILED: RIGHT SUPERIOR UPPER BACK

## 2020-12-22 ASSESSMENT — LOCATION ZONE DERM
LOCATION ZONE: TRUNK
LOCATION ZONE: FEET
LOCATION ZONE: NOSE

## 2021-01-14 DIAGNOSIS — Z23 NEED FOR VACCINATION: ICD-10-CM

## 2022-01-04 ENCOUNTER — APPOINTMENT (RX ONLY)
Dept: URBAN - METROPOLITAN AREA CLINIC 35 | Facility: CLINIC | Age: 82
Setting detail: DERMATOLOGY
End: 2022-01-04

## 2022-01-04 DIAGNOSIS — Z71.89 OTHER SPECIFIED COUNSELING: ICD-10-CM

## 2022-01-04 DIAGNOSIS — Z85.828 PERSONAL HISTORY OF OTHER MALIGNANT NEOPLASM OF SKIN: ICD-10-CM

## 2022-01-04 DIAGNOSIS — D22 MELANOCYTIC NEVI: ICD-10-CM

## 2022-01-04 DIAGNOSIS — L82.1 OTHER SEBORRHEIC KERATOSIS: ICD-10-CM

## 2022-01-04 DIAGNOSIS — L81.4 OTHER MELANIN HYPERPIGMENTATION: ICD-10-CM

## 2022-01-04 PROBLEM — D22.5 MELANOCYTIC NEVI OF TRUNK: Status: ACTIVE | Noted: 2022-01-04

## 2022-01-04 PROBLEM — D22.72 MELANOCYTIC NEVI OF LEFT LOWER LIMB, INCLUDING HIP: Status: ACTIVE | Noted: 2022-01-04

## 2022-01-04 PROCEDURE — 99213 OFFICE O/P EST LOW 20 MIN: CPT

## 2022-01-04 PROCEDURE — ? COUNSELING

## 2022-01-04 PROCEDURE — ? OBSERVATION AND MEASURE

## 2022-01-04 ASSESSMENT — LOCATION SIMPLE DESCRIPTION DERM
LOCATION SIMPLE: UPPER BACK
LOCATION SIMPLE: RIGHT UPPER BACK
LOCATION SIMPLE: NOSE
LOCATION SIMPLE: ABDOMEN
LOCATION SIMPLE: LEFT UPPER BACK
LOCATION SIMPLE: LEFT PLANTAR SURFACE

## 2022-01-04 ASSESSMENT — LOCATION DETAILED DESCRIPTION DERM
LOCATION DETAILED: INFERIOR THORACIC SPINE
LOCATION DETAILED: RIGHT SUPERIOR UPPER BACK
LOCATION DETAILED: LEFT MEDIAL UPPER BACK
LOCATION DETAILED: EPIGASTRIC SKIN
LOCATION DETAILED: NASAL DORSUM
LOCATION DETAILED: LEFT MEDIAL PLANTAR HEEL

## 2022-01-04 ASSESSMENT — LOCATION ZONE DERM
LOCATION ZONE: NOSE
LOCATION ZONE: TRUNK
LOCATION ZONE: FEET

## 2023-01-03 ENCOUNTER — APPOINTMENT (RX ONLY)
Dept: URBAN - METROPOLITAN AREA CLINIC 35 | Facility: CLINIC | Age: 83
Setting detail: DERMATOLOGY
End: 2023-01-03

## 2023-01-03 DIAGNOSIS — Z85.828 PERSONAL HISTORY OF OTHER MALIGNANT NEOPLASM OF SKIN: ICD-10-CM

## 2023-01-03 DIAGNOSIS — D22 MELANOCYTIC NEVI: ICD-10-CM

## 2023-01-03 DIAGNOSIS — Z71.89 OTHER SPECIFIED COUNSELING: ICD-10-CM

## 2023-01-03 DIAGNOSIS — L85.3 XEROSIS CUTIS: ICD-10-CM

## 2023-01-03 DIAGNOSIS — F42.4 EXCORIATION (SKIN-PICKING) DISORDER: ICD-10-CM

## 2023-01-03 DIAGNOSIS — L82.1 OTHER SEBORRHEIC KERATOSIS: ICD-10-CM

## 2023-01-03 DIAGNOSIS — L81.4 OTHER MELANIN HYPERPIGMENTATION: ICD-10-CM

## 2023-01-03 PROBLEM — S70.921A UNSPECIFIED SUPERFICIAL INJURY OF RIGHT THIGH, INITIAL ENCOUNTER: Status: ACTIVE | Noted: 2023-01-03

## 2023-01-03 PROBLEM — D22.72 MELANOCYTIC NEVI OF LEFT LOWER LIMB, INCLUDING HIP: Status: ACTIVE | Noted: 2023-01-03

## 2023-01-03 PROBLEM — D22.5 MELANOCYTIC NEVI OF TRUNK: Status: ACTIVE | Noted: 2023-01-03

## 2023-01-03 PROCEDURE — ? COUNSELING

## 2023-01-03 PROCEDURE — 99213 OFFICE O/P EST LOW 20 MIN: CPT

## 2023-01-03 PROCEDURE — ? ADDITIONAL NOTES

## 2023-01-03 PROCEDURE — ? OBSERVATION AND MEASURE

## 2023-01-03 ASSESSMENT — LOCATION DETAILED DESCRIPTION DERM
LOCATION DETAILED: LEFT MEDIAL PLANTAR HEEL
LOCATION DETAILED: RIGHT ANTERIOR DISTAL THIGH
LOCATION DETAILED: LEFT PROXIMAL DORSAL FOREARM
LOCATION DETAILED: INFERIOR THORACIC SPINE
LOCATION DETAILED: EPIGASTRIC SKIN
LOCATION DETAILED: RIGHT SUPERIOR UPPER BACK
LOCATION DETAILED: LEFT MEDIAL UPPER BACK
LOCATION DETAILED: LEFT SUPERIOR MEDIAL UPPER BACK
LOCATION DETAILED: NASAL DORSUM

## 2023-01-03 ASSESSMENT — LOCATION ZONE DERM
LOCATION ZONE: ARM
LOCATION ZONE: FEET
LOCATION ZONE: LEG
LOCATION ZONE: NOSE
LOCATION ZONE: TRUNK

## 2023-01-03 ASSESSMENT — LOCATION SIMPLE DESCRIPTION DERM
LOCATION SIMPLE: RIGHT UPPER BACK
LOCATION SIMPLE: UPPER BACK
LOCATION SIMPLE: LEFT FOREARM
LOCATION SIMPLE: NOSE
LOCATION SIMPLE: LEFT PLANTAR SURFACE
LOCATION SIMPLE: LEFT UPPER BACK
LOCATION SIMPLE: ABDOMEN
LOCATION SIMPLE: RIGHT THIGH

## 2023-01-03 NOTE — PROCEDURE: ADDITIONAL NOTES
Additional Notes: Will recheck the area in one month if not healed.
Detail Level: Simple
Render Risk Assessment In Note?: no

## 2023-11-10 ENCOUNTER — HOSPITAL ENCOUNTER (EMERGENCY)
Facility: MEDICAL CENTER | Age: 83
End: 2023-11-10
Attending: EMERGENCY MEDICINE
Payer: MEDICARE

## 2023-11-10 ENCOUNTER — APPOINTMENT (OUTPATIENT)
Dept: RADIOLOGY | Facility: MEDICAL CENTER | Age: 83
End: 2023-11-10
Attending: EMERGENCY MEDICINE
Payer: MEDICARE

## 2023-11-10 VITALS
DIASTOLIC BLOOD PRESSURE: 86 MMHG | BODY MASS INDEX: 19.12 KG/M2 | TEMPERATURE: 99.6 F | HEIGHT: 64 IN | OXYGEN SATURATION: 95 % | SYSTOLIC BLOOD PRESSURE: 155 MMHG | HEART RATE: 82 BPM | RESPIRATION RATE: 14 BRPM | WEIGHT: 112 LBS

## 2023-11-10 DIAGNOSIS — S01.81XA FACIAL LACERATION, INITIAL ENCOUNTER: ICD-10-CM

## 2023-11-10 DIAGNOSIS — S82.831A OTHER CLOSED FRACTURE OF DISTAL END OF RIGHT FIBULA, INITIAL ENCOUNTER: ICD-10-CM

## 2023-11-10 DIAGNOSIS — S09.90XA CLOSED HEAD INJURY, INITIAL ENCOUNTER: ICD-10-CM

## 2023-11-10 PROCEDURE — 700101 HCHG RX REV CODE 250: Performed by: EMERGENCY MEDICINE

## 2023-11-10 PROCEDURE — 73610 X-RAY EXAM OF ANKLE: CPT | Mod: RT

## 2023-11-10 PROCEDURE — 90715 TDAP VACCINE 7 YRS/> IM: CPT | Performed by: EMERGENCY MEDICINE

## 2023-11-10 PROCEDURE — 304999 HCHG REPAIR-SIMPLE/INTERMED LEVEL 1

## 2023-11-10 PROCEDURE — 303747 HCHG EXTRA SUTURE

## 2023-11-10 PROCEDURE — 304217 HCHG IRRIGATION SYSTEM

## 2023-11-10 PROCEDURE — 303353 HCHG DERMABOND SKIN ADHESIVE

## 2023-11-10 PROCEDURE — 99284 EMERGENCY DEPT VISIT MOD MDM: CPT

## 2023-11-10 PROCEDURE — 700111 HCHG RX REV CODE 636 W/ 250 OVERRIDE (IP): Performed by: EMERGENCY MEDICINE

## 2023-11-10 PROCEDURE — 90471 IMMUNIZATION ADMIN: CPT

## 2023-11-10 PROCEDURE — 70450 CT HEAD/BRAIN W/O DYE: CPT

## 2023-11-10 RX ORDER — LIDOCAINE HYDROCHLORIDE AND EPINEPHRINE BITARTRATE 20; .01 MG/ML; MG/ML
10 INJECTION, SOLUTION SUBCUTANEOUS ONCE
Status: COMPLETED | OUTPATIENT
Start: 2023-11-10 | End: 2023-11-10

## 2023-11-10 RX ADMIN — CLOSTRIDIUM TETANI TOXOID ANTIGEN (FORMALDEHYDE INACTIVATED), CORYNEBACTERIUM DIPHTHERIAE TOXOID ANTIGEN (FORMALDEHYDE INACTIVATED), BORDETELLA PERTUSSIS TOXOID ANTIGEN (GLUTARALDEHYDE INACTIVATED), BORDETELLA PERTUSSIS FILAMENTOUS HEMAGGLUTININ ANTIGEN (FORMALDEHYDE INACTIVATED), BORDETELLA PERTUSSIS PERTACTIN ANTIGEN, AND BORDETELLA PERTUSSIS FIMBRIAE 2/3 ANTIGEN 0.5 ML: 5; 2; 2.5; 5; 3; 5 INJECTION, SUSPENSION INTRAMUSCULAR at 14:36

## 2023-11-10 RX ADMIN — LIDOCAINE HYDROCHLORIDE,EPINEPHRINE BITARTRATE 10 ML: 20; .01 INJECTION, SOLUTION INFILTRATION; PERINEURAL at 14:45

## 2023-11-10 NOTE — ED NOTES
The pt is laying in bed with eyes closed. Breathing is even and unlabored. Wound wrapped with no evidence of bleeding. Vitals stable on the monitor

## 2023-11-10 NOTE — ED TRIAGE NOTES
"Chief Complaint   Patient presents with    T-5000 GLF     Patient reports she had MGLF at home today after tripping over rug. +head strike -LOC - thinners. Patient has small laceration above eyebrow.      84 yo female bib EMS from home for above.     Patient AO4 GCS 15 on arrival.    Patient evaluated at charge desk by ERP.    Patient to CT then blue 19. Report to SIMBA Ulloa.    BP (!) 143/65   Pulse 79   Temp 36.7 °C (98 °F) (Temporal)   Resp 16   Ht 1.626 m (5' 4\")   Wt 50.8 kg (112 lb)   SpO2 97%   BMI 19.22 kg/m²     "

## 2023-11-10 NOTE — ED NOTES
The pt is laying in bed alert and oriented. Tech at bedside performing walking boot installation. Tech performing ambulation trial. The pt ambulates with a steady gait with a walker. Vitals stable on the monitor

## 2023-11-10 NOTE — ED PROVIDER NOTES
"ED Provider Note    CHIEF COMPLAINT  Ground-level fall, face laceration      EXTERNAL RECORDS REVIEWED  Outpatient orthopedics note 10/26/2023, left knee pain    HPI/ROS  OUTSIDE HISTORIAN(S):  EMS      Fifi Clark is a 83 y.o. female who presents for evaluation of a trip and fall with head injury and facial laceration.  Brought in by EMS, evaluated the charge desk as a code TBI.  Her only other complaint is of left knee pain which is chronic and right foot and ankle pain which is new.  EMS reports about a 2 cm laceration just above her right eye near her eyebrow.  No loss of conscious.  No neck or back pain.  No chest or abdominal pain.  She offers no other complaints.  No anticoagulation.    PAST MEDICAL HISTORY   has a past medical history of Arthritis, Cancer (HCC) (2020), Cancer (HCC) (2015), GERD (gastroesophageal reflux disease), High cholesterol, Hyperlipemia, Hypertension, and Indigestion.    SURGICAL HISTORY   has a past surgical history that includes gyn surgery (2001); other; partial mastectomy (Left, 3/12/2020); and node biopsy sentinel (Left, 3/12/2020).    FAMILY HISTORY  Family History   Problem Relation Age of Onset    Cancer Father     Cancer Other        SOCIAL HISTORY  Social History     Tobacco Use    Smoking status: Never    Smokeless tobacco: Never   Vaping Use    Vaping Use: Never used   Substance and Sexual Activity    Alcohol use: Yes     Comment: 3 per day, wine or whiskey     Drug use: No     Comment: THC/CBD cream for knee pain     Sexual activity: Not on file       CURRENT MEDICATIONS  Home Medications    **Home medications have not yet been reviewed for this encounter**         ALLERGIES  Allergies   Allergen Reactions    Other Environmental      Hayfever    Sulfa Drugs Rash     .       PHYSICAL EXAM  VITAL SIGNS: BP (!) 143/65   Pulse 79   Temp 36.7 °C (98 °F) (Temporal)   Resp 16   Ht 1.626 m (5' 4\")   Wt 50.8 kg (112 lb)   SpO2 97%   BMI 19.22 kg/m²    Constitutional: " Well appearing patient in no acute distress.  Awake and alert, not toxic nor ill in appearance.  HENT: 4 cm laceration above the right eye through the eyebrow.  Eyes: No scleral icterus. Normal conjunctiva   Thorax & Lungs: Normal nonlabored respirations. I appreciate no wheezing, rhonchi or rales. There is normal air movement.  Upon cardiac ascultation I appreciate a regular heart rhythm and a normal rate.   Abdomen: The abdomen is not visibly distended. Upon palpation, I find it to be without tenderness.  No mass appreciated.  Skin: The exposed portions of skin reveal no obvious rash or other abnormalities.  Extremities/Musculoskeletal: Right foot: Ecchymosis and edema overlying the distal fibula and lateral malleolus  Neurologic: Alert & oriented. No focal deficits observed.      DIAGNOSTIC STUDIES / PROCEDURES    RADIOLOGY  I have independently interpreted the diagnostic imaging associated with this visit and am waiting the final reading from the radiologist.   My preliminary interpretation is as follows: No ICH  Radiologist interpretation:   DX-ANKLE 3+ VIEWS RIGHT   Final Result      1.  Acute nondisplaced transverse fracture of right distal fibula. Overlying soft tissue swelling is noted.      CT-HEAD W/O   Final Result      1.  Cerebral atrophy.      2.  White matter lucencies most consistent with small vessel ischemic change versus demyelination or gliosis.      3.  Otherwise, Head CT without contrast with no acute findings. No evidence of acute cerebral infarction, hemorrhage or mass lesion.                     Laceration Repair Procedure Note    Indication: Laceration    Procedure: The patient was placed in the appropriate position and anesthesia around the laceration was obtained by infiltration using 1% Lidocaine with epinephrine. The wound was highly contaminated .The area was then irrigated with normal saline, explored with no foreign bodies discovered, and draped in a sterile fashion. The laceration  was closed with 5-0 Ethilon using interrupted sutures.  There were 2 other small lacerations that were repaired with Dermabond just lateral to the right eye.  The wound area was then dressed with     Total repaired wound length: 4 cm.     Other Items: Suture count: 7    The patient tolerated the procedure well.    Complications: None     COURSE & MEDICAL DECISION MAKING        INITIAL ASSESSMENT, COURSE AND PLAN  Care Narrative: 83-year-old female with a trip and fall and subsequent head and face injury.  Evaluated at the charge desk as a code TBI, brought to head CT where intracranial hemorrhage was excluded.  She has a laceration above her right eye that is repaired here in the emergency department.  Her tetanus has been updated.  The patient complains of left knee pain which is chronic.  No acute findings on exam.  The right ankle was x-rayed revealing a nondisplaced distal fibula fracture.  She was placed in a walking boot.  At this point, the patient is stable and appropriate for discharge.  She will use over-the-counter analgesia as needed.  She has been referred to orthopedics for follow-up.  She has been provided with a walker to help with stability while walking with this boot.    DISPOSITION AND DISCUSSIONS    Decision tools and prescription drugs considered including, but not limited to: I did consider a course of prophylactic antibiotics but as the wound was cleansed well I did not feel this was indicated.    FINAL DIAGNOSIS  1. Closed head injury, initial encounter    2. Facial laceration, initial encounter    3. Other closed fracture of distal end of right fibula, initial encounter           Electronically signed by: Hammad Root M.D., 11/10/2023 1:53 PM

## 2023-11-10 NOTE — ED NOTES
The pt alert and oriented. No indicators of pain. Vitals stable on the monitor. Bleeding controlled

## 2024-01-04 ENCOUNTER — APPOINTMENT (RX ONLY)
Dept: URBAN - METROPOLITAN AREA CLINIC 35 | Facility: CLINIC | Age: 84
Setting detail: DERMATOLOGY
End: 2024-01-04

## 2024-01-04 DIAGNOSIS — Z85.828 PERSONAL HISTORY OF OTHER MALIGNANT NEOPLASM OF SKIN: ICD-10-CM

## 2024-01-04 DIAGNOSIS — Z71.89 OTHER SPECIFIED COUNSELING: ICD-10-CM

## 2024-01-04 DIAGNOSIS — L81.4 OTHER MELANIN HYPERPIGMENTATION: ICD-10-CM

## 2024-01-04 DIAGNOSIS — D22 MELANOCYTIC NEVI: ICD-10-CM | Status: UNCHANGED

## 2024-01-04 DIAGNOSIS — L85.3 XEROSIS CUTIS: ICD-10-CM

## 2024-01-04 DIAGNOSIS — L82.1 OTHER SEBORRHEIC KERATOSIS: ICD-10-CM

## 2024-01-04 PROBLEM — D22.72 MELANOCYTIC NEVI OF LEFT LOWER LIMB, INCLUDING HIP: Status: ACTIVE | Noted: 2024-01-04

## 2024-01-04 PROBLEM — D22.5 MELANOCYTIC NEVI OF TRUNK: Status: ACTIVE | Noted: 2024-01-04

## 2024-01-04 PROCEDURE — ? COUNSELING

## 2024-01-04 PROCEDURE — ? OBSERVATION AND MEASURE

## 2024-01-04 PROCEDURE — 99213 OFFICE O/P EST LOW 20 MIN: CPT

## 2024-01-04 ASSESSMENT — LOCATION DETAILED DESCRIPTION DERM
LOCATION DETAILED: EPIGASTRIC SKIN
LOCATION DETAILED: INFERIOR THORACIC SPINE
LOCATION DETAILED: RIGHT SUPERIOR UPPER BACK
LOCATION DETAILED: LEFT MEDIAL UPPER BACK
LOCATION DETAILED: LEFT SUPERIOR MEDIAL UPPER BACK
LOCATION DETAILED: LEFT PROXIMAL DORSAL FOREARM
LOCATION DETAILED: NASAL DORSUM
LOCATION DETAILED: LEFT MEDIAL PLANTAR HEEL

## 2024-01-04 ASSESSMENT — LOCATION SIMPLE DESCRIPTION DERM
LOCATION SIMPLE: UPPER BACK
LOCATION SIMPLE: LEFT UPPER BACK
LOCATION SIMPLE: NOSE
LOCATION SIMPLE: ABDOMEN
LOCATION SIMPLE: LEFT FOREARM
LOCATION SIMPLE: LEFT PLANTAR SURFACE
LOCATION SIMPLE: RIGHT UPPER BACK

## 2024-01-04 ASSESSMENT — LOCATION ZONE DERM
LOCATION ZONE: ARM
LOCATION ZONE: TRUNK
LOCATION ZONE: NOSE
LOCATION ZONE: FEET

## 2025-01-09 ENCOUNTER — APPOINTMENT (OUTPATIENT)
Dept: URBAN - METROPOLITAN AREA CLINIC 35 | Facility: CLINIC | Age: 85
Setting detail: DERMATOLOGY
End: 2025-01-09

## 2025-01-09 DIAGNOSIS — Z71.89 OTHER SPECIFIED COUNSELING: ICD-10-CM

## 2025-01-09 DIAGNOSIS — D22 MELANOCYTIC NEVI: ICD-10-CM

## 2025-01-09 DIAGNOSIS — L57.0 ACTINIC KERATOSIS: ICD-10-CM

## 2025-01-09 DIAGNOSIS — Z85.828 PERSONAL HISTORY OF OTHER MALIGNANT NEOPLASM OF SKIN: ICD-10-CM

## 2025-01-09 DIAGNOSIS — L82.1 OTHER SEBORRHEIC KERATOSIS: ICD-10-CM

## 2025-01-09 DIAGNOSIS — L81.4 OTHER MELANIN HYPERPIGMENTATION: ICD-10-CM

## 2025-01-09 PROBLEM — D22.72 MELANOCYTIC NEVI OF LEFT LOWER LIMB, INCLUDING HIP: Status: ACTIVE | Noted: 2025-01-09

## 2025-01-09 PROBLEM — D22.5 MELANOCYTIC NEVI OF TRUNK: Status: ACTIVE | Noted: 2025-01-09

## 2025-01-09 PROCEDURE — ? COUNSELING

## 2025-01-09 PROCEDURE — 17000 DESTRUCT PREMALG LESION: CPT

## 2025-01-09 PROCEDURE — 17003 DESTRUCT PREMALG LES 2-14: CPT

## 2025-01-09 PROCEDURE — ? LIQUID NITROGEN

## 2025-01-09 PROCEDURE — 99213 OFFICE O/P EST LOW 20 MIN: CPT | Mod: 25

## 2025-01-09 PROCEDURE — ? OBSERVATION AND MEASURE

## 2025-01-09 ASSESSMENT — LOCATION DETAILED DESCRIPTION DERM
LOCATION DETAILED: NASAL DORSUM
LOCATION DETAILED: EPIGASTRIC SKIN
LOCATION DETAILED: RIGHT SUPERIOR UPPER BACK
LOCATION DETAILED: INFERIOR THORACIC SPINE
LOCATION DETAILED: NASAL SUPRATIP
LOCATION DETAILED: LEFT PROXIMAL DORSAL FOREARM
LOCATION DETAILED: LEFT MEDIAL UPPER BACK
LOCATION DETAILED: LEFT MEDIAL PLANTAR HEEL
LOCATION DETAILED: LEFT SUPERIOR LATERAL BUCCAL CHEEK

## 2025-01-09 ASSESSMENT — LOCATION ZONE DERM
LOCATION ZONE: TRUNK
LOCATION ZONE: NOSE
LOCATION ZONE: FACE
LOCATION ZONE: FEET
LOCATION ZONE: ARM

## 2025-01-09 ASSESSMENT — LOCATION SIMPLE DESCRIPTION DERM
LOCATION SIMPLE: LEFT FOREARM
LOCATION SIMPLE: RIGHT UPPER BACK
LOCATION SIMPLE: LEFT UPPER BACK
LOCATION SIMPLE: LEFT PLANTAR SURFACE
LOCATION SIMPLE: NOSE
LOCATION SIMPLE: LEFT CHEEK
LOCATION SIMPLE: ABDOMEN
LOCATION SIMPLE: UPPER BACK

## 2025-01-09 NOTE — PROCEDURE: LIQUID NITROGEN
Render Note In Bullet Format When Appropriate: No
Show Applicator Variable?: Yes
Duration Of Freeze Thaw-Cycle (Seconds): 10
Consent: The patient's consent was obtained including but not limited to risks of crusting, scabbing, blistering, scarring, darker or lighter pigmentary change, recurrence, incomplete removal and infection.
Number Of Freeze-Thaw Cycles: 1 freeze-thaw cycle
Detail Level: Detailed
Post-Care Instructions: I reviewed with the patient in detail post-care instructions. Patient is to wear sunprotection, and avoid picking at any of the treated lesions. Pt may apply Vaseline to crusted or scabbing areas.

## (undated) DEVICE — PROTECTOR ULNA NERVE - (36PR/CA)

## (undated) DEVICE — SET LEADWIRE 5 LEAD BEDSIDE DISPOSABLE ECG (1SET OF 5/EA)

## (undated) DEVICE — DRESSING TRANSPARENT FILM TEGADERM 2.375 X 2.75"  (100EA/BX)"

## (undated) DEVICE — ELECTRODE DUAL RETURN W/ CORD - (50/PK)

## (undated) DEVICE — GLOVE BIOGEL SZ 6.5 SURGICAL PF LTX (50PR/BX 4BX/CA)

## (undated) DEVICE — KIT ANESTHESIA W/CIRCUIT & 3/LT BAG W/FILTER (20EA/CA)

## (undated) DEVICE — TUBING CLEARLINK DUO-VENT - C-FLO (48EA/CA)

## (undated) DEVICE — CANISTER SUCTION 3000ML MECHANICAL FILTER AUTO SHUTOFF MEDI-VAC NONSTERILE LF DISP  (40EA/CA)

## (undated) DEVICE — SENSOR SPO2 NEO LNCS ADHESIVE (20/BX) SEE USER NOTES

## (undated) DEVICE — SUTURE GENERAL

## (undated) DEVICE — SLEEVE, VASO, THIGH, MED

## (undated) DEVICE — SUCTION INSTRUMENT YANKAUER BULBOUS TIP W/O VENT (50EA/CA)

## (undated) DEVICE — SUTURE 3-0 VICRYL PLUS SH - 8X 18 INCH (12/BX)

## (undated) DEVICE — NEPTUNE 4 PORT MANIFOLD - (20/PK)

## (undated) DEVICE — LACTATED RINGERS INJ 1000 ML - (14EA/CA 60CA/PF)

## (undated) DEVICE — HEAD HOLDER JUNIOR/ADULT

## (undated) DEVICE — SUTURE 4-0 VICRYL PLUS FS-2 - 27 INCH (36/BX)

## (undated) DEVICE — KIT ROOM DECONTAMINATION

## (undated) DEVICE — BLADE SURGICAL #15 - (50/BX 3BX/CA)

## (undated) DEVICE — LIGASURE SM JAW SEALER CRVD - (6EA/CA)

## (undated) DEVICE — GOWN WARMING STANDARD FLEX - (30/CA)

## (undated) DEVICE — SODIUM CHL IRRIGATION 0.9% 1000ML (12EA/CA)

## (undated) DEVICE — TRAY SKIN SCRUB PVP WET (20EA/CA) PART #DYND70356 DISCONTINUED

## (undated) DEVICE — NEEDLE NON SAFETY 25 GA X 1 1/2 IN HYPO (100EA/BX)

## (undated) DEVICE — MASK ANESTHESIA ADULT  - (100/CA)

## (undated) DEVICE — BOVIE BLADE COATED - (50/PK)

## (undated) DEVICE — PACK MINOR BASIN - (2EA/CA)

## (undated) DEVICE — SET EXTENSION WITH 2 PORTS (48EA/CA) ***PART #2C8610 IS A SUBSTITUTE*****

## (undated) DEVICE — ELECTRODE 850 FOAM ADHESIVE - HYDROGEL RADIOTRNSPRNT (50/PK)

## (undated) DEVICE — CHLORAPREP 26 ML APPLICATOR - ORANGE TINT(25/CA)

## (undated) DEVICE — COVER PROBE STERILE CONE (12EA/CA)

## (undated) DEVICE — SHEET TRANSVERSE LAP - (12EA/CA)

## (undated) DEVICE — GLOVE BIOGEL INDICATOR SZ 6.5 SURGICAL PF LTX - (50PR/BX 4BX/CA)